# Patient Record
Sex: MALE | Race: OTHER | Employment: UNEMPLOYED | URBAN - METROPOLITAN AREA
[De-identification: names, ages, dates, MRNs, and addresses within clinical notes are randomized per-mention and may not be internally consistent; named-entity substitution may affect disease eponyms.]

---
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
## 2020-01-04 ENCOUNTER — HOSPITAL ENCOUNTER (EMERGENCY)
Age: 20
Discharge: HOME | End: 2020-01-04
Attending: EMERGENCY MEDICINE
Payer: SELF-PAY

## 2020-01-04 VITALS
OXYGEN SATURATION: 98 % | HEART RATE: 78 BPM | WEIGHT: 200 LBS | BODY MASS INDEX: 27.09 KG/M2 | RESPIRATION RATE: 16 BRPM | DIASTOLIC BLOOD PRESSURE: 62 MMHG | HEIGHT: 72 IN | SYSTOLIC BLOOD PRESSURE: 128 MMHG | TEMPERATURE: 98.2 F

## 2020-01-04 DIAGNOSIS — L03.031 PARONYCHIA OF GREAT TOE, RIGHT: Primary | ICD-10-CM

## 2020-01-04 DIAGNOSIS — L60.0 INGROWN NAIL OF GREAT TOE OF RIGHT FOOT: ICD-10-CM

## 2020-01-04 LAB
BASOPHILS # BLD: 0 K/UL (ref 0–0.2)
BASOPHILS NFR BLD: 1 % (ref 0–2)
DIFFERENTIAL METHOD BLD: ABNORMAL
EOSINOPHIL # BLD: 0.2 K/UL (ref 0–0.8)
EOSINOPHIL NFR BLD: 4 % (ref 0.5–7.8)
ERYTHROCYTE [DISTWIDTH] IN BLOOD BY AUTOMATED COUNT: 11.9 % (ref 11.9–14.6)
HCT VFR BLD AUTO: 39.9 % (ref 41.1–50.3)
HGB BLD-MCNC: 13.6 G/DL (ref 13.6–17.2)
IMM GRANULOCYTES # BLD AUTO: 0 K/UL (ref 0–0.5)
IMM GRANULOCYTES NFR BLD AUTO: 0 % (ref 0–5)
LYMPHOCYTES # BLD: 2 K/UL (ref 0.5–4.6)
LYMPHOCYTES NFR BLD: 34 % (ref 13–44)
MCH RBC QN AUTO: 30.1 PG (ref 26.1–32.9)
MCHC RBC AUTO-ENTMCNC: 34.1 G/DL (ref 31.4–35)
MCV RBC AUTO: 88.3 FL (ref 79.6–97.8)
MONOCYTES # BLD: 0.6 K/UL (ref 0.1–1.3)
MONOCYTES NFR BLD: 11 % (ref 4–12)
NEUTS SEG # BLD: 3 K/UL (ref 1.7–8.2)
NEUTS SEG NFR BLD: 50 % (ref 43–78)
NRBC # BLD: 0 K/UL (ref 0–0.2)
PLATELET # BLD AUTO: 294 K/UL (ref 150–450)
PMV BLD AUTO: 9.8 FL (ref 9.4–12.3)
RBC # BLD AUTO: 4.52 M/UL (ref 4.23–5.6)
WBC # BLD AUTO: 5.9 K/UL (ref 4.3–11.1)

## 2020-01-04 PROCEDURE — 75810000107 HC EXCSN NAIL PART/COMPLETE: Performed by: EMERGENCY MEDICINE

## 2020-01-04 PROCEDURE — 85025 COMPLETE CBC W/AUTO DIFF WBC: CPT

## 2020-01-04 PROCEDURE — 99283 EMERGENCY DEPT VISIT LOW MDM: CPT | Performed by: EMERGENCY MEDICINE

## 2020-01-04 RX ORDER — NAPROXEN 500 MG/1
500 TABLET ORAL 2 TIMES DAILY WITH MEALS
Qty: 20 TAB | Refills: 0 | Status: SHIPPED | OUTPATIENT
Start: 2020-01-04 | End: 2020-01-14

## 2020-01-04 RX ORDER — CLINDAMYCIN HYDROCHLORIDE 300 MG/1
300 CAPSULE ORAL 3 TIMES DAILY
Qty: 21 CAP | Refills: 0 | Status: SHIPPED | OUTPATIENT
Start: 2020-01-04 | End: 2020-01-11

## 2020-01-04 NOTE — ED PROVIDER NOTES
Kavitha GerberMittal      Anay Mcintosh II is a 23 y.o. male seen on 1/4/2020 at 9:33 AM in the [address] [place] [address] in room ER07/07. Chief Complaint   Patient presents with    Ingrown Toenail     HPI: 80-year-old male presenting to the emergency department complaint of right great toe pain. He states this is been present for about 2 to 3 weeks getting progressively worse. Initially it was over the lateral aspect of his great toe there was swelling and redness, then it began to do the same over the medial aspect. He has tried warm soaks. There have been some occasional times when it is spontaneously draining. He presents because of increasing redness, swelling and discomfort of his great toe. Historian: Patient    REVIEW OF SYSTEMS     Review of Systems   Constitutional: Negative for fatigue and fever. HENT: Negative. Eyes: Negative. Respiratory: Negative for cough, chest tightness, shortness of breath and wheezing. Cardiovascular: Negative for chest pain. Gastrointestinal: Negative for abdominal distention, abdominal pain, diarrhea, nausea and vomiting. Genitourinary: Negative for dysuria, flank pain, frequency, genital sores and urgency. Musculoskeletal: Negative. Skin: Positive for color change and wound. Negative for rash. Neurological: Negative for dizziness, syncope and headaches. All other systems reviewed and are negative. PAST MEDICAL HISTORY     Past Medical History:   Diagnosis Date    Allergic rhinitis     Asthma     Eczema     Pes planus      History reviewed. No pertinent surgical history.   Social History     Socioeconomic History    Marital status: SINGLE     Spouse name: Not on file    Number of children: Not on file    Years of education: Not on file    Highest education level: Not on file   Tobacco Use    Smoking status: Never Smoker    Smokeless tobacco: Never Used   Substance and Sexual Activity    Alcohol use: No    Drug use: No    Sexual activity: Never     Prior to Admission Medications   Prescriptions Last Dose Informant Patient Reported? Taking?   benzoyl peroxide 5 % topical lotion Not Taking at Unknown time  No No   Sig: Apply  to affected area nightly. fluocinoNIDE (LIDEX) 0.05 % ointment Not Taking at Unknown time  No No   Sig: Apply  to affected area two (2) times a day. minocycline (MINOCIN, DYNACIN) 50 mg capsule Not Taking at Unknown time  No No   Sig: Take 1 Cap by mouth two (2) times a day. tretinoin (RETIN-A) 0.1 % topical cream Not Taking at Unknown time  No No   Sig: Apply  to affected area nightly. Facility-Administered Medications: None     No Known Allergies     PHYSICAL EXAM       Vitals:    01/04/20 0854   BP: 136/66   Pulse: 74   Resp: 16   Temp: 98.1 °F (36.7 °C)   SpO2: 100%    Vital signs were reviewed. Physical Exam  Vitals signs reviewed. Constitutional:       General: He is not in acute distress. Appearance: He is well-developed. HENT:      Head: Normocephalic and atraumatic. Eyes:      Pupils: Pupils are equal, round, and reactive to light. Neck:      Musculoskeletal: Normal range of motion. Pulmonary:      Effort: Pulmonary effort is normal.   Musculoskeletal: Normal range of motion. Comments: Right great toe: Edema, erythema of the toe, medial and lateral paronychial abscesses and ingrown nails medial and lateral   Neurological:      Mental Status: He is alert and oriented to person, place, and time.    Psychiatric:         Behavior: Behavior normal.          MEDICAL DECISION MAKING     MDM    REMOVAL OF NAIL PLATE  Date/Time: 9/2/3200 11:27 AM  Performed by: Randall Ferrara MD  Authorized by: Randall Ferrara MD     Consent:     Consent obtained:  Verbal    Risks discussed:  Pain, bleeding, incomplete removal and infection  Location:     Foot:  R big toe  Pre-procedure details:     Skin preparation:  Betadine  Anesthesia (see MAR for exact dosages): Anesthesia method:  Nerve block    Block location:  Digital blcok    Block needle gauge:  27 G    Block anesthetic:  Lidocaine 1% w/o epi    Block injection procedure:  Anatomic landmarks identified, introduced needle, negative aspiration for blood, incremental injection and anatomic landmarks palpated    Block outcome:  Anesthesia achieved  Nail Removal:     Nail removed:  Complete  Post-procedure details:     Dressing:  4x4 sterile gauze    Patient tolerance of procedure: Tolerated well, no immediate complications        ED Course:  Pt appears to have bilateral paronychia of the R great toe along with bilateral ingrown nails of that toe. 11:29 AM  Given that the nail was ingrown bilaterally, and there was significant edema of the paronychia bilaterally, the nail was removed after discussion of risk and benefits with the patient. There was some purulent drainage from the lateral paronychia. They were incised bilaterally with only drainage from the right paronychia. Given the persistent erythema and edema of the toe, I will place him on a short course of antibiotics. They are comfortable this plan understands reasons to return. Disposition: Discharge home  Diagnosis: Paronychia, ingrown toenail  ____________________________________________________________________  A portion of this note was generated using voice recognition dictation software. While the note has been reviewed for accuracy, please note certain words and phrases may not be transcribed as intended and some grammatical and/or typographical errors may be present.

## 2020-01-04 NOTE — DISCHARGE INSTRUCTIONS
Keep your toe clean and dry for the next 24 hours, you can then soak your foot in mild soap and water daily until the tenderness has resolved.

## 2020-01-04 NOTE — ED TRIAGE NOTES
Pt has had an ingrown toenail to the Right great toe since September. Has been mayur-treating by cleaning and trimming. Worsening over the past month. Increased pain and redness and drainage now.

## 2020-01-04 NOTE — LETTER
[address] [address] EMERGENCY DEPT 
[address] [address] [facility] [address] [phone] Work/School Note Date: 1/4/2020 To Whom It May concern: 
 
Karen Hurt II was seen and treated today in the emergency room by the following provider(s): 
Attending Provider: Emory King MD.   
 
Karen Hurt II may return to work on [id number]. Sincerely, Se Guardado RN

## 2020-01-04 NOTE — ED NOTES
I have reviewed discharge instructions with the patient. The patient verbalized understanding. Patient left ED via Discharge Method: ambulatory to Home with himself. Opportunity for questions and clarification provided. Patient given 1 scripts. To continue your aftercare when you leave the hospital, you may receive an automated call from our care team to check in on how you are doing. This is a free service and part of our promise to provide the best care and service to meet your aftercare needs.  If you have questions, or wish to unsubscribe from this service please call [phone]. Thank you for Choosing our [facility] Emergency Department.